# Patient Record
Sex: MALE | Race: BLACK OR AFRICAN AMERICAN | NOT HISPANIC OR LATINO | Employment: UNEMPLOYED | URBAN - METROPOLITAN AREA
[De-identification: names, ages, dates, MRNs, and addresses within clinical notes are randomized per-mention and may not be internally consistent; named-entity substitution may affect disease eponyms.]

---

## 2020-10-05 ENCOUNTER — TRANSFERRED RECORDS (OUTPATIENT)
Dept: MULTI SPECIALTY CLINIC | Facility: CLINIC | Age: 65
End: 2020-10-05

## 2021-03-03 ENCOUNTER — OFFICE VISIT (OUTPATIENT)
Dept: FAMILY MEDICINE | Facility: CLINIC | Age: 66
End: 2021-03-03
Payer: COMMERCIAL

## 2021-03-03 VITALS
HEART RATE: 83 BPM | SYSTOLIC BLOOD PRESSURE: 139 MMHG | OXYGEN SATURATION: 97 % | WEIGHT: 183 LBS | TEMPERATURE: 97.9 F | DIASTOLIC BLOOD PRESSURE: 86 MMHG

## 2021-03-03 DIAGNOSIS — Z11.3 SCREEN FOR STD (SEXUALLY TRANSMITTED DISEASE): ICD-10-CM

## 2021-03-03 DIAGNOSIS — B18.2 CHRONIC HEPATITIS C WITHOUT HEPATIC COMA (H): ICD-10-CM

## 2021-03-03 DIAGNOSIS — R36.0: Primary | ICD-10-CM

## 2021-03-03 DIAGNOSIS — B35.3 TINEA PEDIS OF LEFT FOOT: ICD-10-CM

## 2021-03-03 DIAGNOSIS — B35.1 ONYCHOMYCOSIS: ICD-10-CM

## 2021-03-03 PROBLEM — N18.2 CKD (CHRONIC KIDNEY DISEASE) STAGE 2, GFR 60-89 ML/MIN: Status: ACTIVE | Noted: 2020-09-21

## 2021-03-03 PROBLEM — Q04.9: Status: ACTIVE | Noted: 2021-03-03

## 2021-03-03 PROBLEM — Z87.74 HISTORY OF ARTERIOVENOUS MALFORMATION (AVM): Status: ACTIVE | Noted: 2021-03-03

## 2021-03-03 PROBLEM — I10 ESSENTIAL HYPERTENSION: Status: ACTIVE | Noted: 2021-03-03

## 2021-03-03 PROBLEM — Z72.0 TOBACCO ABUSE: Status: ACTIVE | Noted: 2021-03-03

## 2021-03-03 PROBLEM — I67.1 DURAL ARTERIOVENOUS FISTULA: Status: ACTIVE | Noted: 2021-03-03

## 2021-03-03 PROCEDURE — 87389 HIV-1 AG W/HIV-1&-2 AB AG IA: CPT | Performed by: FAMILY MEDICINE

## 2021-03-03 PROCEDURE — 99000 SPECIMEN HANDLING OFFICE-LAB: CPT | Performed by: FAMILY MEDICINE

## 2021-03-03 PROCEDURE — 36415 COLL VENOUS BLD VENIPUNCTURE: CPT | Performed by: FAMILY MEDICINE

## 2021-03-03 PROCEDURE — 86780 TREPONEMA PALLIDUM: CPT | Mod: 90 | Performed by: FAMILY MEDICINE

## 2021-03-03 PROCEDURE — 99203 OFFICE O/P NEW LOW 30 MIN: CPT | Performed by: FAMILY MEDICINE

## 2021-03-03 RX ORDER — CICLOPIROX 80 MG/ML
SOLUTION TOPICAL
Qty: 6 ML | Refills: 0 | Status: SHIPPED | OUTPATIENT
Start: 2021-03-03

## 2021-03-03 RX ORDER — CLOTRIMAZOLE 1 %
CREAM (GRAM) TOPICAL 2 TIMES DAILY
Qty: 30 G | Refills: 0 | Status: SHIPPED | OUTPATIENT
Start: 2021-03-03 | End: 2021-03-17

## 2021-03-03 RX ORDER — AMLODIPINE BESYLATE 10 MG/1
TABLET ORAL
COMMUNITY
Start: 2021-02-06

## 2021-03-03 RX ORDER — LISINOPRIL 20 MG/1
TABLET ORAL
COMMUNITY
Start: 2021-01-29

## 2021-03-03 SDOH — HEALTH STABILITY: MENTAL HEALTH: HOW MANY STANDARD DRINKS CONTAINING ALCOHOL DO YOU HAVE ON A TYPICAL DAY?: NOT ASKED

## 2021-03-03 SDOH — HEALTH STABILITY: MENTAL HEALTH: HOW OFTEN DO YOU HAVE 6 OR MORE DRINKS ON ONE OCCASION?: NOT ASKED

## 2021-03-03 SDOH — HEALTH STABILITY: MENTAL HEALTH: HOW OFTEN DO YOU HAVE A DRINK CONTAINING ALCOHOL?: NOT ASKED

## 2021-03-03 ASSESSMENT — ENCOUNTER SYMPTOMS
DYSURIA: 0
CONSTIPATION: 0
DIFFICULTY URINATING: 0
VOMITING: 0
ABDOMINAL PAIN: 0
HEMATURIA: 0
CHILLS: 0
FREQUENCY: 0
NAUSEA: 0
SHORTNESS OF BREATH: 0
FEVER: 0
DIARRHEA: 0

## 2021-03-03 NOTE — Clinical Note
Please abstract the following data from this visit with this patient into the appropriate field in Epic:    Tests that can be patient reported without a hard copy:        Other Tests found in the patient's chart through Chart Review/Care Everywhere:    Colonoscopy done by this group [facility] on this date: 10/05/2020 and Hep C done by this [facility] on this date: 10/2020    Note to Abstraction: If this section is blank, no results were found via Chart Review/Care Everywhere.

## 2021-03-03 NOTE — PATIENT INSTRUCTIONS
Trial of Penlac for the toenail and right thumbnail condition, as discussed.    Clotrimazole twice daily for up to 14 days (webspace between the toes), as prescribed.    Follow-up with Urology if your labs are negative, as discussed.

## 2021-03-03 NOTE — LETTER
March 4, 2021      Fermin Ledesma    [address]  [facility] [address]        Dear ,    We are writing to inform you of your test results.    Your test results fall within the expected range(s) or remain unchanged from previous results.  Please continue with current treatment plan.    Resulted Orders   Treponema Abs w Reflex to RPR and Titer   Result Value Ref Range    Treponema Antibodies Nonreactive NR^Nonreactive      Comment:      Methodology Change: Test performed on the [employer] L[employer]ison XL by Treponema   pallidum Total Antibodies Assay as of 3.17.2020.     HIV Antigen Antibody Combo   Result Value Ref Range    HIV Antigen Antibody Combo Nonreactive NR^Nonreactive          Comment:      HIV-1 p24 Ag & HIV-1/HIV-2 Ab Not Detected       If you have any questions or concerns, please call the clinic at the number listed above.       Sincerely,      Sandee Mckenzie MD

## 2021-03-03 NOTE — PROGRESS NOTES
Assessment & Plan     Fermin was seen today for derm problem and std.    Diagnoses and all orders for this visit:    Abnormal non-bloody discharge from penis, starting in 2018  -     NEISSERIA GONORRHOEA PCR  -     CHLAMYDIA TRACHOMATIS PCR  -     UROLOGY ADULT REFERRAL; Future    Screen for STD (sexually transmitted disease)  -     Treponema Abs w Reflex to RPR and Titer  -     HIV Antigen Antibody Combo  -     NEISSERIA GONORRHOEA PCR  -     CHLAMYDIA TRACHOMATIS PCR    Tinea pedis of left foot  -     clotrimazole (LOTRIMIN) 1 % external cream; Apply topically 2 times daily for 14 days    Onychomycosis, toenails and right thumbnail  -     ciclopirox (PENLAC) 8 % external solution; Apply to adjacent skin and affected nails daily.  Remove with alcohol every 7 days, then repeat.    Chronic hepatitis C without hepatic coma (H), previously seen by GI      Discussed risks and benefits of treatment strategies.    Did not recommend oral antifungal treatment, given the history of hepatitis C.    Patient will consider following up with GI, as previously advised.    Patient declines a referral to Podiatry, in agreement with the following plan:    Patient Instructions     Trial of Penlac for the toenail and right thumbnail condition, as discussed.    Clotrimazole twice daily for up to 14 days (webspace between the toes), as prescribed.    Follow-up with Urology if your labs are negative, as discussed.    Follow up if symptoms worsen or do not improve after 1 week.      Follow up with Urology, as discussed.    Disclaimer: This dictation was composed using a combination of keyboarding and voice recognition software.  As a result, there may be errors in the dictation that have gone undetected.  Please consider this when interpreting the information found in this chart.    Sandee Mckenzie MD  [facility] CLAUDINE Christensen is a 65 year old who presents for the following [facility]  "issues:    HPI     The patient is a 65-year-old male, with history of nonbloody discharge from his penis (only noted during coitus), initially noticed in 2018.  Patient finds the condition embarrassing, given the large amount of the discharge.  Previous STD testing was negative 2/22/2018, including GC/Chlamydia and Trichomonas testing.  Patient has had 1 partner in the past year.  Patient denies dysuria, urinary frequency, hematuria, or other concerning UTI/STD symptoms.  See the ROS section below.  Patient was told that his condition was \"nonlethal\" before, but is concerned with his chronicity.  He is interested in seeing a Urology specialist.    The patient is concerned about chronic dystrophic changes involving his toenails and right thumbnail.  Patient is concerned that he has a fungal infection, requesting treatment today.  Toenails and right thumbnail are not painful.  Patient has a history of hepatitis C (previously seen by GI), but he denies having a history of diabetes.  A previous Hemoglobin A1C was within normal limits (4.8 on 9/4/2020), per chart review.  Patient states that he was recommended hepatitis C treatment, but he was advised to stop smoking marijuana first.  BMP and LFTs were within normal limits 9/4/2020 (except for the Creatinine 1.37, with GFR 63 mg/dL), per chart review.    The patient has a fungal infection involving the webspace between his left first and second toes.  He has not used Tinactin in the recent past, requesting treatment.    Review of Systems   Constitutional: Negative for chills and fever.   Respiratory: Negative for shortness of breath.    Cardiovascular: Negative for chest pain and peripheral edema.   Gastrointestinal: Negative for abdominal pain, constipation, diarrhea, nausea and vomiting.   Endocrine:        Denies history of diabetes.   Genitourinary: Positive for discharge. Negative for difficulty urinating, dysuria, frequency, genital sores, hematuria, impotence, " testicular pain and urgency.   Skin: Positive for rash.        Current Outpatient Medications   Medication Sig Dispense Refill     amLODIPine (NORVASC) 10 MG tablet                      lisinopril (ZESTRIL) 20 MG tablet            Objective    /86 (BP Location: Right arm, Cuff Size: Adult Large)   Pulse 83   Temp 97.9  F (36.6  C) (Tympanic)   Wt 83 kg (183 lb)   SpO2 97%   There is no height or weight on file to calculate BMI.  Physical Exam     GENERAL APPEARANCE:  Awake, alert, and in no acute distress.  PSYCHIATRIC: Pleasant, verbose historian.  HEENT:  No conjunctivitis.    NECK:  Spontaneous full range of motion.  No thyromegaly or mass.  No lymphadenopathy.  HEART:  Normal S1, S2.  Regular rate and rhythm.  No murmurs, rubs, or gallops.  LUNGS:  No respiratory distress.  No wheezes, rales, or rhonchi.  ABDOMEN:  Not distended.  Soft.  Not tender.  No mass.  No organomegaly.  :  Deferred, as the patient requests a Urology consult.  EXTREMITIES:  Moves 4 extremities.   No edema.  NEUROLOGIC:  Gait within normal limits.    SKIN: The patient has a 1 cm area of tinea pedis involving the webspace between his left first and second toes.  Patient has thickened, dystrophic toenails, likely consistent with onychomycosis.  The patient's right thumbnail is similarly thickened and dystrophic, noted to overlap horizontally in the center.

## 2021-03-04 LAB
HIV 1+2 AB+HIV1 P24 AG SERPL QL IA: NONREACTIVE
T PALLIDUM AB SER QL: NONREACTIVE

## 2021-03-10 ENCOUNTER — OFFICE VISIT (OUTPATIENT)
Dept: FAMILY MEDICINE | Facility: CLINIC | Age: 66
End: 2021-03-10
Payer: COMMERCIAL

## 2021-03-10 VITALS
DIASTOLIC BLOOD PRESSURE: 88 MMHG | SYSTOLIC BLOOD PRESSURE: 141 MMHG | WEIGHT: 181 LBS | TEMPERATURE: 97.9 F | OXYGEN SATURATION: 96 % | HEART RATE: 88 BPM

## 2021-03-10 DIAGNOSIS — R36.0: Primary | ICD-10-CM

## 2021-03-10 PROCEDURE — 87491 CHLMYD TRACH DNA AMP PROBE: CPT | Performed by: FAMILY MEDICINE

## 2021-03-10 PROCEDURE — 87591 N.GONORRHOEAE DNA AMP PROB: CPT | Performed by: FAMILY MEDICINE

## 2021-03-10 PROCEDURE — 99207 PR NO CHARGE LOS: CPT | Performed by: FAMILY MEDICINE

## 2021-03-10 NOTE — PROGRESS NOTES
The patient scheduled a follow-up appointment with a float provider, as he was unable to leave a urine sample at the time of his 3/3/2021 visit.  Patient would like to proceed with GC/Chlamydia testing, as discussed 3/3/2021, but he prefers a urethral swab to a urine sample.      Vitals were reviewed in Epic.    Follow-up testing was ordered (GC/Chlamydia PCR--currently pending), as noted.  Patient states he has not picked up his prescriptions from his 3/3/2021 visit yet, denying new concerns today.    The patient was scheduled for an establish care visit 3/29/2021, as noted in Epic.    No charge for visit today.

## 2021-03-10 NOTE — LETTER
[facility]  [address]  [address]  [place]  [address]  Tel: [phone]    March 12, 2021    Fermin Matteo Baltazar    [address] [facility] [id number]        Dear Mr. Ledesma,    The results of your recent tests were negative.  If you have any further questions or problems, please contact our office.      Sincerely,    SHANAE DAVALOS/MAE          Enclosure: Lab Results  Results for orders placed or performed in visit on 03/10/21   NEISSERIA GONORRHOEA PCR     Status: None    Specimen: Urethral   Result Value Ref Range    Specimen Descrip Urethral     N Gonorrhea PCR Negative NEG^Negative   CHLAMYDIA TRACHOMATIS PCR     Status: None    Specimen: Urethral   Result Value Ref Range    Specimen Description Urethral     Chlamydia Trachomatis PCR Negative NEG^Negative

## 2021-03-11 LAB
C TRACH DNA SPEC QL NAA+PROBE: NEGATIVE
N GONORRHOEA DNA SPEC QL NAA+PROBE: NEGATIVE
SPECIMEN SOURCE: NORMAL
SPECIMEN SOURCE: NORMAL

## 2023-08-21 ENCOUNTER — LAB (OUTPATIENT)
Dept: LAB | Facility: CLINIC | Age: 68
End: 2023-08-21

## 2023-08-21 DIAGNOSIS — Z31.41 ENCOUNTER FOR SPERM COUNT FOR FERTILITY TESTING: ICD-10-CM

## 2023-08-21 PROCEDURE — 89322 SEMEN ANAL STRICT CRITERIA: CPT

## 2023-08-22 LAB
ABNORMAL SPERM MORPHOLOGY: 97
ABSTINENCE DAYS: 2 DAYS (ref 2–7)
AGGLUTINATION: NO
ANALYSIS TEMP - CENTIGRADE: 21 CENTIGRADE
COLLECTION METHOD: ABNORMAL
COLLECTION SITE: ABNORMAL
CONSENT TO RELEASE TO PARTNER: NO
DAL- RECEIVED TIME: ABNORMAL
HEAD DEFECT: 97 %
IMMOTILE: 68 %
LIQUEFIED: YES
MIDPIECE DEFECT: 20 %
NON-PROGRESSIVE MOTILITY: 10 %
NORMAL SPERM MORPHOLOGY: 3 % NORMAL FORMS
PROGRESSIVE MOTILITY: 22 %
ROUND CELLS: 0.6 MILLION/ML
SPECIMEN PH: 8 PH
SPECIMEN VOLUME: 0.4 ML
SPERM CONCENTRATION: 38.2 MILLION/ML
TAIL DEFECT: 3 %
TIME OF ANALYSIS: ABNORMAL
TOTAL PROGRESSIVE MOTILE NUMBER: 3 MILLION
TOTAL SPERM NUMBER: 15 MILLION
VISCOUS: YES
VITALITY: 33 %

## 2023-09-06 ENCOUNTER — TELEPHONE (OUTPATIENT)
Dept: UROLOGY | Facility: CLINIC | Age: 68
End: 2023-09-06

## 2023-09-06 NOTE — TELEPHONE ENCOUNTER
[facility] Call Center    Phone Message    May a detailed message be left on voicemail: yes     Reason for Call: Pt would like transfer care to us as he's having an oozing when he gets hard. He noted that this is not pre-come and they've tested this with his semen but he's wondering if this can be tested positive. Writer did tell Fermin that he needs to establish care with a provider before they can determine any testing, however pt stated that if they're just going to tell him they'll only test with his semen, he does not want to be seen.    Please review and call pt to discuss. Thank you    Action Taken: Message routed to:  [facility] Uro    Travel Screening: Not Applicable

## 2024-09-25 DIAGNOSIS — Z11.3 ENCOUNTER FOR SCREENING EXAMINATION FOR SEXUALLY TRANSMITTED DISEASE: Primary | ICD-10-CM

## 2024-09-25 DIAGNOSIS — R36.9 ABNORMAL PENILE DISCHARGE: ICD-10-CM
